# Patient Record
Sex: FEMALE | Race: WHITE | NOT HISPANIC OR LATINO
[De-identification: names, ages, dates, MRNs, and addresses within clinical notes are randomized per-mention and may not be internally consistent; named-entity substitution may affect disease eponyms.]

---

## 2022-01-03 ENCOUNTER — NON-APPOINTMENT (OUTPATIENT)
Age: 67
End: 2022-01-03

## 2022-01-25 ENCOUNTER — NON-APPOINTMENT (OUTPATIENT)
Age: 67
End: 2022-01-25

## 2022-01-25 ENCOUNTER — APPOINTMENT (OUTPATIENT)
Dept: FAMILY MEDICINE | Facility: CLINIC | Age: 67
End: 2022-01-25
Payer: COMMERCIAL

## 2022-01-25 ENCOUNTER — TRANSCRIPTION ENCOUNTER (OUTPATIENT)
Age: 67
End: 2022-01-25

## 2022-01-25 VITALS
SYSTOLIC BLOOD PRESSURE: 132 MMHG | OXYGEN SATURATION: 99 % | WEIGHT: 119 LBS | BODY MASS INDEX: 21.9 KG/M2 | DIASTOLIC BLOOD PRESSURE: 72 MMHG | RESPIRATION RATE: 16 BRPM | HEIGHT: 62 IN | HEART RATE: 69 BPM | TEMPERATURE: 97.5 F

## 2022-01-25 VITALS — DIASTOLIC BLOOD PRESSURE: 66 MMHG | SYSTOLIC BLOOD PRESSURE: 102 MMHG

## 2022-01-25 DIAGNOSIS — Z83.3 FAMILY HISTORY OF DIABETES MELLITUS: ICD-10-CM

## 2022-01-25 DIAGNOSIS — Z82.69 FAMILY HISTORY OF OTHER DISEASES OF THE MUSCULOSKELETAL SYSTEM AND CONNECTIVE TISSUE: ICD-10-CM

## 2022-01-25 DIAGNOSIS — Z82.0 FAMILY HISTORY OF EPILEPSY AND OTHER DISEASES OF THE NERVOUS SYSTEM: ICD-10-CM

## 2022-01-25 DIAGNOSIS — Z80.0 FAMILY HISTORY OF MALIGNANT NEOPLASM OF DIGESTIVE ORGANS: ICD-10-CM

## 2022-01-25 DIAGNOSIS — Z80.7 FAMILY HISTORY OF OTHER MALIGNANT NEOPLASMS OF LYMPHOID, HEMATOPOIETIC AND RELATED TISSUES: ICD-10-CM

## 2022-01-25 DIAGNOSIS — Z83.79 FAMILY HISTORY OF OTHER DISEASES OF THE DIGESTIVE SYSTEM: ICD-10-CM

## 2022-01-25 DIAGNOSIS — Z78.9 OTHER SPECIFIED HEALTH STATUS: ICD-10-CM

## 2022-01-25 PROCEDURE — 99387 INIT PM E/M NEW PAT 65+ YRS: CPT

## 2022-01-25 NOTE — HEALTH RISK ASSESSMENT
[HIV test declined] : HIV test declined [Hepatitis C test declined] : Hepatitis C test declined [Excellent] : ~his/her~  mood as  excellent [Never] : Never [No] : In the past 12 months have you used drugs other than those required for medical reasons? No [One fall no injury in past year] : Patient reported one fall in the past year without injury [0] : 2) Feeling down, depressed, or hopeless: Not at all (0) [PHQ-2 Negative - No further assessment needed] : PHQ-2 Negative - No further assessment needed [None] : None [With Family] : lives with family [Employed] : employed [] :  [Sexually Active] : sexually active [Feels Safe at Home] : Feels safe at home [Fully functional (bathing, dressing, toileting, transferring, walking, feeding)] : Fully functional (bathing, dressing, toileting, transferring, walking, feeding) [Fully functional (using the telephone, shopping, preparing meals, housekeeping, doing laundry, using] : Fully functional and needs no help or supervision to perform IADLs (using the telephone, shopping, preparing meals, housekeeping, doing laundry, using transportation, managing medications and managing finances) [1 or 2 (0 pts)] : 1 or 2 (0 points) [Never (0 pts)] : Never (0 points) [FreeTextEntry1] : none  [de-identified] : none  [Audit-CScore] : 0 [de-identified] : exercise bike 45 mins daily, walking  [de-identified] : balanced; no dairy; supplements- centrum mvn, citrical  [[id number]] : 0 [Change in mental status noted] : No change in mental status noted [Language] : denies difficulty with language [High Risk Behavior] : no high risk behavior [Reports changes in hearing] : Reports no changes in hearing [Reports changes in vision] : Reports no changes in vision [Reports changes in dental health] : Reports no changes in dental health [MammogramDate] : 03/2021 [MammogramComments] : will order [PapSmearDate] : 06/2021 [BoneDensityDate] : 06/2013 [BoneDensityComments] : took actonel and fosafax at the age of 41 for 10 years- had early menopause- had a broken rib at the time and broke a toe by bumping into a chair [ColonoscopyDate] : 08/2019 [ColonoscopyComments] : repeat in 5 years- son  of colon ca (30 yo)  [de-identified] : - moved from [place]  [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

## 2022-01-25 NOTE — HISTORY OF PRESENT ILLNESS
[FreeTextEntry1] : CPE [de-identified] : overall is feeling well\par denies any other complaints or concerns at this time

## 2022-01-25 NOTE — ASSESSMENT
[FreeTextEntry1] : Routine medical examination\par VSS- exam normal \par Advised healthy diet and exercise\par will follow up labs\par will follow up mammo and bone density  \par patient verbalizes understanding and patient is stable upon discharge\par

## 2022-02-03 ENCOUNTER — NON-APPOINTMENT (OUTPATIENT)
Age: 67
End: 2022-02-03

## 2022-02-15 LAB
25(OH)D3 SERPL-MCNC: 43.4 NG/ML
ALBUMIN SERPL ELPH-MCNC: 4.7 G/DL
ALP BLD-CCNC: 74 U/L
ALT SERPL-CCNC: 13 U/L
ANION GAP SERPL CALC-SCNC: 13 MMOL/L
AST SERPL-CCNC: 21 U/L
BASOPHILS # BLD AUTO: 0.01 K/UL
BASOPHILS NFR BLD AUTO: 0.2 %
BILIRUB DIRECT SERPL-MCNC: 0.1 MG/DL
BILIRUB INDIRECT SERPL-MCNC: 0.3 MG/DL
BILIRUB SERPL-MCNC: 0.4 MG/DL
BUN SERPL-MCNC: 12 MG/DL
CALCIUM SERPL-MCNC: 9.6 MG/DL
CHLORIDE SERPL-SCNC: 103 MMOL/L
CHOLEST SERPL-MCNC: 201 MG/DL
CO2 SERPL-SCNC: 23 MMOL/L
CREAT SERPL-MCNC: 0.6 MG/DL
EOSINOPHIL # BLD AUTO: 0.04 K/UL
EOSINOPHIL NFR BLD AUTO: 0.9 %
ESTIMATED AVERAGE GLUCOSE: 114 MG/DL
FOLATE SERPL-MCNC: >20 NG/ML
GLUCOSE SERPL-MCNC: 101 MG/DL
HBA1C MFR BLD HPLC: 5.6 %
HCT VFR BLD CALC: 40.8 %
HDLC SERPL-MCNC: 97 MG/DL
HGB BLD-MCNC: 12.5 G/DL
IMM GRANULOCYTES NFR BLD AUTO: 0.2 %
IRON SERPL-MCNC: 122 UG/DL
LDLC SERPL CALC-MCNC: 92 MG/DL
LYMPHOCYTES # BLD AUTO: 1.36 K/UL
LYMPHOCYTES NFR BLD AUTO: 30.6 %
MAN DIFF?: NORMAL
MCHC RBC-ENTMCNC: 29.1 PG
MCHC RBC-ENTMCNC: 30.6 GM/DL
MCV RBC AUTO: 95.1 FL
MONOCYTES # BLD AUTO: 0.32 K/UL
MONOCYTES NFR BLD AUTO: 7.2 %
NEUTROPHILS # BLD AUTO: 2.7 K/UL
NEUTROPHILS NFR BLD AUTO: 60.9 %
NONHDLC SERPL-MCNC: 104 MG/DL
PLATELET # BLD AUTO: 261 K/UL
POTASSIUM SERPL-SCNC: 4.1 MMOL/L
PROT SERPL-MCNC: 6.7 G/DL
RBC # BLD: 4.29 M/UL
RBC # FLD: 13.5 %
SODIUM SERPL-SCNC: 139 MMOL/L
T4 FREE SERPL-MCNC: 1.5 NG/DL
TRIGL SERPL-MCNC: 62 MG/DL
TSH SERPL-ACNC: 2.13 UIU/ML
VIT B12 SERPL-MCNC: 936 PG/ML
WBC # FLD AUTO: 4.44 K/UL

## 2022-04-13 ENCOUNTER — TRANSCRIPTION ENCOUNTER (OUTPATIENT)
Age: 67
End: 2022-04-13

## 2022-04-14 ENCOUNTER — TRANSCRIPTION ENCOUNTER (OUTPATIENT)
Age: 67
End: 2022-04-14

## 2022-04-26 DIAGNOSIS — M25.541 PAIN IN JOINTS OF RIGHT HAND: ICD-10-CM

## 2022-04-26 DIAGNOSIS — M25.542 PAIN IN JOINTS OF RIGHT HAND: ICD-10-CM

## 2022-05-23 ENCOUNTER — NON-APPOINTMENT (OUTPATIENT)
Age: 67
End: 2022-05-23

## 2022-05-24 ENCOUNTER — TRANSCRIPTION ENCOUNTER (OUTPATIENT)
Age: 67
End: 2022-05-24

## 2022-06-24 ENCOUNTER — APPOINTMENT (OUTPATIENT)
Dept: ORTHOPEDIC SURGERY | Facility: CLINIC | Age: 67
End: 2022-06-24

## 2022-06-24 DIAGNOSIS — M65.311 TRIGGER THUMB, RIGHT THUMB: ICD-10-CM

## 2022-06-24 DIAGNOSIS — M65.321 TRIGGER FINGER, RIGHT INDEX FINGER: ICD-10-CM

## 2022-06-24 DIAGNOSIS — M65.331 TRIGGER FINGER, RIGHT MIDDLE FINGER: ICD-10-CM

## 2022-06-24 PROCEDURE — 99204 OFFICE O/P NEW MOD 45 MIN: CPT | Mod: 25

## 2022-06-24 PROCEDURE — 20550 NJX 1 TENDON SHEATH/LIGAMENT: CPT | Mod: RT,F5

## 2022-06-24 RX ADMIN — LIDOCAINE HYDROCHLORIDE 0.5 %: 10 INJECTION, SOLUTION INFILTRATION; PERINEURAL at 00:00

## 2022-06-24 RX ADMIN — BETAMETHASONE SODIUM PHOSPHATE AND BETAMETHASONE ACETATE 1 MG/ML: 3; 3 INJECTION, SUSPENSION INTRA-ARTICULAR; INTRALESIONAL; INTRAMUSCULAR; SOFT TISSUE at 00:00

## 2022-06-24 NOTE — HISTORY OF PRESENT ILLNESS
[Right] : right hand dominant [FreeTextEntry1] : She comes in today for evaluation of right hand pain, localized to the thumb, index and middle fingers. She denies injury as well as numbness and tingling. She denies locking and triggering. She reports to have a history of arthritis in the hands. \par \par She is scheduled to begin Prolia in August.\par \par She has a history of bilateral ring trigger fingers. She was treated by another physician last June with a cortisone injection to each. \par \par Of note, she reports to type a great deal with regard to her job.

## 2022-06-24 NOTE — ADDENDUM
[FreeTextEntry1] : I, Adriana Ellsworth, acted solely as a scribe for Dr. Rasmussen on this date on 06/24/2022.

## 2022-06-24 NOTE — PHYSICAL EXAM
[de-identified] : - Constitutional: This is a female in no obvious distress.  \par - Psych: Patient is alert and oriented to person, place and time.  Patient has a normal mood and affect.\par - Cardiovascular: Normal pulses throughout the upper extremities.  No significant varicosities are noted in the upper extremities. \par - Neuro: Strength and sensation are intact throughout the upper extremities.  Patient has normal coordination.\par - Respiratory:  Patient exhibits no evidence of shortness of breath or difficulty breathing.\par - Skin: No rashes, lesions, or other abnormalities are noted in the upper extremities.\par \par --- \par \par Examination of her right thumb demonstrates mild swelling and tenderness along the A1 pulley.  There is no triggering.  She also has swelling and tenderness along the A1 pulley of the index finger and tender without swelling along the A1 pulley of the middle finger.  There is no triggering of the digits.  She has full flexion of the digits.  There is no tenderness along the PIP or DIP joints.  She is neurovascularly intact distally.

## 2022-06-24 NOTE — PROCEDURE
[FreeTextEntry1] : -  After a discussion of risks and benefits, the patient agreed to proceed with a cortisone injection.  \par -  Side: Right\par -  Finger 1: Trigger thumb\par -  Finger 2: Index finger trigger finger\par -  Finger 3: Middle finger trigger finger.\par -  Medications: 0.5 cc of 1% Lidocaine and 1 cc of betamethasone, using sterile technique.\par -  Patient tolerated the procedure well, without complications.\par -  Patient was told that the symptoms may worsen for a day or two, and should then begin to improve. \par -  Instructions: Patient was instructed on activity modification for the next several days.\par -  Follow-up: Within 4 weeks to assess response to the injection.

## 2022-06-24 NOTE — END OF VISIT
[FreeTextEntry3] : This note was written by Adriana Ellsworth on 06/24/2022 acting solely as a scribe for Dr. Mariano Rasmussen.\par  \par All medical record entries made by the Scribe were at my, Dr. Mariano Rasmussen, direction and personally dictated by me on 06/24/2022. I have personally reviewed the chart and agree that the record accurately reflects my personal performance of the history, physical exam, assessment and plan.

## 2022-06-24 NOTE — DISCUSSION/SUMMARY
[FreeTextEntry1] : She has findings consistent with right thumb, index and middle finger flexor tendinitis /trigger fingers. \par \par I had a discussion with the patient regarding today's visit, the prognosis of this diagnosis, and treatment recommendations and options. At this time, she agreed to proceed with cortisone injections to the flexor tendon sheath of the right thumb, index and middle fingers. \par \par She has agreed to the above plan of management and has expressed full understanding.  All questions were fully answered to their satisfaction. \par \par My cumulative time spent on this visit included: Preparation for the visit, review of the medical records, review of pertinent diagnostic studies, examination and counseling of the patient on the above diagnosis, treatment plan and prognosis, orders of diagnostic tests, medication and/or appropriate procedures and documentation in the medical records of today's visit.

## 2022-06-25 RX ORDER — LIDOCAINE HYDROCHLORIDE 10 MG/ML
1 INJECTION, SOLUTION INFILTRATION; PERINEURAL
Refills: 0 | Status: COMPLETED | OUTPATIENT
Start: 2022-06-24

## 2022-06-25 RX ORDER — BETAMETHA AC,SOD PHOS/WATER/PF 6 MG/ML
6 (3-3) VIAL (ML) INJECTION
Qty: 1 | Refills: 0 | Status: COMPLETED | OUTPATIENT
Start: 2022-06-24

## 2022-08-04 ENCOUNTER — APPOINTMENT (OUTPATIENT)
Dept: OBGYN | Facility: CLINIC | Age: 67
End: 2022-08-04

## 2022-08-04 VITALS
BODY MASS INDEX: 22.63 KG/M2 | DIASTOLIC BLOOD PRESSURE: 73 MMHG | SYSTOLIC BLOOD PRESSURE: 150 MMHG | HEIGHT: 62 IN | HEART RATE: 72 BPM | WEIGHT: 123 LBS

## 2022-08-04 DIAGNOSIS — Z63.4 DISAPPEARANCE AND DEATH OF FAMILY MEMBER: ICD-10-CM

## 2022-08-04 PROCEDURE — 99387 INIT PM E/M NEW PAT 65+ YRS: CPT

## 2022-08-04 RX ORDER — DENOSUMAB 60 MG/ML
60 INJECTION SUBCUTANEOUS
Qty: 1 | Refills: 0 | Status: ACTIVE | COMMUNITY
Start: 2022-06-13

## 2022-08-04 RX ORDER — FEXOFENADINE HCL 60 MG
TABLET ORAL
Refills: 0 | Status: ACTIVE | COMMUNITY

## 2022-08-04 RX ORDER — MULTIVITAMIN
CAPSULE ORAL
Refills: 0 | Status: ACTIVE | COMMUNITY

## 2022-08-04 SDOH — SOCIAL STABILITY - SOCIAL INSECURITY: DISSAPEARANCE AND DEATH OF FAMILY MEMBER: Z63.4

## 2022-08-04 NOTE — HISTORY OF PRESENT ILLNESS
[Mammogramdate] : EMR [BreastSonogramDate] : EMR [PapSmeardate] : EMR [BoneDensityDate] : 2022 [ColonoscopyDate] : 2019

## 2022-08-05 LAB — HPV HIGH+LOW RISK DNA PNL CVX: NOT DETECTED

## 2022-08-09 LAB — CYTOLOGY CVX/VAG DOC THIN PREP: ABNORMAL

## 2023-01-09 ENCOUNTER — NON-APPOINTMENT (OUTPATIENT)
Age: 68
End: 2023-01-09

## 2023-01-31 ENCOUNTER — APPOINTMENT (OUTPATIENT)
Dept: FAMILY MEDICINE | Facility: CLINIC | Age: 68
End: 2023-01-31
Payer: COMMERCIAL

## 2023-01-31 VITALS
HEIGHT: 62 IN | BODY MASS INDEX: 23 KG/M2 | RESPIRATION RATE: 16 BRPM | SYSTOLIC BLOOD PRESSURE: 140 MMHG | OXYGEN SATURATION: 98 % | DIASTOLIC BLOOD PRESSURE: 70 MMHG | HEART RATE: 67 BPM | WEIGHT: 125 LBS | TEMPERATURE: 97.7 F

## 2023-01-31 DIAGNOSIS — Z13.820 ENCOUNTER FOR SCREENING FOR OSTEOPOROSIS: ICD-10-CM

## 2023-01-31 DIAGNOSIS — Z12.39 ENCOUNTER FOR OTHER SCREENING FOR MALIGNANT NEOPLASM OF BREAST: ICD-10-CM

## 2023-01-31 DIAGNOSIS — Z82.49 FAMILY HISTORY OF ISCHEMIC HEART DISEASE AND OTHER DISEASES OF THE CIRCULATORY SYSTEM: ICD-10-CM

## 2023-01-31 DIAGNOSIS — Z12.83 ENCOUNTER FOR SCREENING FOR MALIGNANT NEOPLASM OF SKIN: ICD-10-CM

## 2023-01-31 DIAGNOSIS — Z87.39 PERSONAL HISTORY OF OTHER DISEASES OF THE MUSCULOSKELETAL SYSTEM AND CONNECTIVE TISSUE: ICD-10-CM

## 2023-01-31 PROCEDURE — 93000 ELECTROCARDIOGRAM COMPLETE: CPT

## 2023-01-31 PROCEDURE — 99397 PER PM REEVAL EST PAT 65+ YR: CPT | Mod: 25

## 2023-02-06 NOTE — HISTORY OF PRESENT ILLNESS
[FreeTextEntry1] : CPE [de-identified] : overall is feeling well\par denies any other complaints or concerns at this time

## 2023-02-06 NOTE — HEALTH RISK ASSESSMENT
[Very Good] : ~his/her~  mood as very good [Never] : Never [No] : In the past 12 months have you used drugs other than those required for medical reasons? No [1 or 2 (0 pts)] : 1 or 2 (0 points) [Never (0 pts)] : Never (0 points) [0] : 2) Feeling down, depressed, or hopeless: Not at all (0) [PHQ-2 Negative - No further assessment needed] : PHQ-2 Negative - No further assessment needed [Patient reported mammogram was normal] : Patient reported mammogram was normal [Patient reported PAP Smear was normal] : Patient reported PAP Smear was normal [Patient reported bone density results were abnormal] : Patient reported bone density results were abnormal [Patient reported colonoscopy was normal] : Patient reported colonoscopy was normal [HIV test declined] : HIV test declined [Hepatitis C test declined] : Hepatitis C test declined [None] : None [With Family] : lives with family [] :  [Feels Safe at Home] : Feels safe at home [Fully functional (bathing, dressing, toileting, transferring, walking, feeding)] : Fully functional (bathing, dressing, toileting, transferring, walking, feeding) [Fully functional (using the telephone, shopping, preparing meals, housekeeping, doing laundry, using] : Fully functional and needs no help or supervision to perform IADLs (using the telephone, shopping, preparing meals, housekeeping, doing laundry, using transportation, managing medications and managing finances) [FreeTextEntry1] : none [de-identified] : none [de-identified] : endo, gyn, ortho  [Audit-CScore] : 0 [de-identified] : walking  [de-identified] : no dairy or red meat; supplements- calcium, mvn, claritin, probiotic  [[id number]] : 0 [Change in mental status noted] : No change in mental status noted [Language] : denies difficulty with language [Reports changes in hearing] : Reports no changes in hearing [Reports changes in vision] : Reports no changes in vision [Reports changes in dental health] : Reports no changes in dental health [MammogramDate] : 04/2022  [MammogramComments] : will reorder  [PapSmearDate] : 08/2022  [BoneDensityDate] : 04/2022 [ColonoscopyDate] : 08/2019  [de-identified] :   [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

## 2023-08-14 ENCOUNTER — NON-APPOINTMENT (OUTPATIENT)
Age: 68
End: 2023-08-14

## 2023-08-15 ENCOUNTER — APPOINTMENT (OUTPATIENT)
Dept: OBGYN | Facility: CLINIC | Age: 68
End: 2023-08-15
Payer: COMMERCIAL

## 2023-08-15 VITALS
SYSTOLIC BLOOD PRESSURE: 153 MMHG | DIASTOLIC BLOOD PRESSURE: 80 MMHG | WEIGHT: 126.2 LBS | HEIGHT: 62 IN | HEART RATE: 67 BPM | BODY MASS INDEX: 23.22 KG/M2

## 2023-08-15 DIAGNOSIS — Z01.419 ENCOUNTER FOR GYNECOLOGICAL EXAMINATION (GENERAL) (ROUTINE) W/OUT ABNORMAL FINDINGS: ICD-10-CM

## 2023-08-15 PROCEDURE — 99397 PER PM REEVAL EST PAT 65+ YR: CPT

## 2023-08-15 NOTE — HISTORY OF PRESENT ILLNESS
[BreastSonogramDate] : EMR [Mammogramdate] : EMR [PapSmeardate] : EMR [BoneDensityDate] : 2022 [ColonoscopyDate] : 2019

## 2023-09-05 ENCOUNTER — APPOINTMENT (OUTPATIENT)
Dept: ORTHOPEDIC SURGERY | Facility: CLINIC | Age: 68
End: 2023-09-05

## 2023-11-16 ENCOUNTER — TRANSCRIPTION ENCOUNTER (OUTPATIENT)
Age: 68
End: 2023-11-16

## 2024-02-05 ENCOUNTER — APPOINTMENT (OUTPATIENT)
Dept: FAMILY MEDICINE | Facility: CLINIC | Age: 69
End: 2024-02-05
Payer: COMMERCIAL

## 2024-02-05 VITALS
HEIGHT: 61.5 IN | RESPIRATION RATE: 16 BRPM | HEART RATE: 64 BPM | SYSTOLIC BLOOD PRESSURE: 112 MMHG | DIASTOLIC BLOOD PRESSURE: 70 MMHG | BODY MASS INDEX: 23.86 KG/M2 | TEMPERATURE: 97.3 F | OXYGEN SATURATION: 99 % | WEIGHT: 128 LBS

## 2024-02-05 DIAGNOSIS — M85.9 DISORDER OF BONE DENSITY AND STRUCTURE, UNSPECIFIED: ICD-10-CM

## 2024-02-05 DIAGNOSIS — Z00.00 ENCOUNTER FOR GENERAL ADULT MEDICAL EXAMINATION W/OUT ABNORMAL FINDINGS: ICD-10-CM

## 2024-02-05 LAB
25(OH)D3 SERPL-MCNC: 42.1 NG/ML
25(OH)D3 SERPL-MCNC: 47.1 NG/ML
ALBUMIN SERPL ELPH-MCNC: 4.4 G/DL
ALBUMIN SERPL ELPH-MCNC: 4.4 G/DL
ALP BLD-CCNC: 45 U/L
ALP BLD-CCNC: 46 U/L
ALT SERPL-CCNC: 13 U/L
ALT SERPL-CCNC: 14 U/L
ANION GAP SERPL CALC-SCNC: 14 MMOL/L
ANION GAP SERPL CALC-SCNC: 9 MMOL/L
AST SERPL-CCNC: 22 U/L
AST SERPL-CCNC: 23 U/L
BASOPHILS # BLD AUTO: 0.01 K/UL
BASOPHILS NFR BLD AUTO: 0.2 %
BILIRUB DIRECT SERPL-MCNC: 0.1 MG/DL
BILIRUB INDIRECT SERPL-MCNC: 0.2 MG/DL
BILIRUB SERPL-MCNC: 0.2 MG/DL
BILIRUB SERPL-MCNC: 0.3 MG/DL
BUN SERPL-MCNC: 13 MG/DL
BUN SERPL-MCNC: 14 MG/DL
CALCIUM SERPL-MCNC: 9 MG/DL
CALCIUM SERPL-MCNC: 9.9 MG/DL
CHLORIDE SERPL-SCNC: 106 MMOL/L
CHLORIDE SERPL-SCNC: 109 MMOL/L
CHOLEST SERPL-MCNC: 179 MG/DL
CO2 SERPL-SCNC: 21 MMOL/L
CO2 SERPL-SCNC: 24 MMOL/L
CREAT SERPL-MCNC: 0.6 MG/DL
CREAT SERPL-MCNC: 0.67 MG/DL
EGFR: 96 ML/MIN/1.73M2
EGFR: 98 ML/MIN/1.73M2
EOSINOPHIL # BLD AUTO: 0.05 K/UL
EOSINOPHIL NFR BLD AUTO: 1.1 %
ESTIMATED AVERAGE GLUCOSE: 114 MG/DL
FOLATE SERPL-MCNC: >20 NG/ML
GLUCOSE SERPL-MCNC: 95 MG/DL
GLUCOSE SERPL-MCNC: 96 MG/DL
HBA1C MFR BLD HPLC: 5.6 %
HCT VFR BLD CALC: 36.9 %
HDLC SERPL-MCNC: 82 MG/DL
HGB BLD-MCNC: 12.1 G/DL
IMM GRANULOCYTES NFR BLD AUTO: 0.5 %
IRON SERPL-MCNC: 83 UG/DL
LDLC SERPL CALC-MCNC: 87 MG/DL
LYMPHOCYTES # BLD AUTO: 1.48 K/UL
LYMPHOCYTES NFR BLD AUTO: 34 %
MAN DIFF?: NORMAL
MCHC RBC-ENTMCNC: 30.1 PG
MCHC RBC-ENTMCNC: 32.8 GM/DL
MCV RBC AUTO: 91.8 FL
MONOCYTES # BLD AUTO: 0.34 K/UL
MONOCYTES NFR BLD AUTO: 7.8 %
NEUTROPHILS # BLD AUTO: 2.45 K/UL
NEUTROPHILS NFR BLD AUTO: 56.4 %
NONHDLC SERPL-MCNC: 97 MG/DL
PLATELET # BLD AUTO: 240 K/UL
POTASSIUM SERPL-SCNC: 4.2 MMOL/L
POTASSIUM SERPL-SCNC: 4.4 MMOL/L
PROT SERPL-MCNC: 6.2 G/DL
PROT SERPL-MCNC: 6.4 G/DL
RBC # BLD: 4.02 M/UL
RBC # FLD: 12.7 %
SODIUM SERPL-SCNC: 139 MMOL/L
SODIUM SERPL-SCNC: 144 MMOL/L
T4 FREE SERPL-MCNC: 1.2 NG/DL
TRIGL SERPL-MCNC: 52 MG/DL
TSH SERPL-ACNC: 2.02 UIU/ML
TSH SERPL-ACNC: 2.09 UIU/ML
VIT B12 SERPL-MCNC: 834 PG/ML
WBC # FLD AUTO: 4.35 K/UL

## 2024-02-05 PROCEDURE — 99397 PER PM REEVAL EST PAT 65+ YR: CPT

## 2024-02-05 RX ORDER — KETOCONAZOLE 20 MG/G
2 CREAM TOPICAL
Qty: 60 | Refills: 0 | Status: COMPLETED | COMMUNITY
Start: 2022-08-02 | End: 2024-02-05

## 2024-02-05 RX ORDER — CICLOPIROX OLAMINE 7.7 MG/G
0.77 CREAM TOPICAL
Qty: 90 | Refills: 0 | Status: ACTIVE | COMMUNITY
Start: 2024-01-02

## 2024-02-05 NOTE — HISTORY OF PRESENT ILLNESS
[FreeTextEntry1] : CPE [de-identified] : 67 yo f, pmhx of osteoporosis, here for CPE overall is feeling well denies any other associated symptoms denies any other complaints or concerns at this time

## 2024-02-05 NOTE — HEALTH RISK ASSESSMENT
[Very Good] : ~his/her~  mood as very good [1 or 2 (0 pts)] : 1 or 2 (0 points) [Never (0 pts)] : Never (0 points) [No] : In the past 12 months have you used drugs other than those required for medical reasons? No [0] : 2) Feeling down, depressed, or hopeless: Not at all (0) [PHQ-2 Negative - No further assessment needed] : PHQ-2 Negative - No further assessment needed [Patient reported mammogram was normal] : Patient reported mammogram was normal [Patient reported PAP Smear was normal] : Patient reported PAP Smear was normal [Patient reported bone density results were abnormal] : Patient reported bone density results were abnormal [Patient reported colonoscopy was normal] : Patient reported colonoscopy was normal [HIV test declined] : HIV test declined [Hepatitis C test declined] : Hepatitis C test declined [None] : None [With Family] : lives with family [] :  [Feels Safe at Home] : Feels safe at home [Fully functional (bathing, dressing, toileting, transferring, walking, feeding)] : Fully functional (bathing, dressing, toileting, transferring, walking, feeding) [Fully functional (using the telephone, shopping, preparing meals, housekeeping, doing laundry, using] : Fully functional and needs no help or supervision to perform IADLs (using the telephone, shopping, preparing meals, housekeeping, doing laundry, using transportation, managing medications and managing finances) [No falls in past year] : Patient reported no falls in the past year [Never] : Never [FreeTextEntry1] : none [de-identified] : none [de-identified] : endo, gyn, cardio, derm [Audit-CScore] : 0 [de-identified] : walking- 45 minutes, exercise bike [de-identified] : no dairy or red meat; supplements- calcium, mvn, claritin, probiotic, occ tumeric  [[id number]] : 0 [Change in mental status noted] : No change in mental status noted [Language] : denies difficulty with language [Reports changes in hearing] : Reports no changes in hearing [Reports changes in vision] : Reports no changes in vision [Reports changes in dental health] : Reports no changes in dental health [MammogramDate] : 04/2023 [MammogramComments] : will reorder  [PapSmearDate] : 08/2022  [BoneDensityDate] : 04/2022 [BoneDensityComments] : on prolia, seeing endo  [ColonoscopyDate] : 08/2019  [ColonoscopyComments] : will refer- last 5 years ago  [de-identified] :   [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 Davis, for ear wax  [de-identified] : wears glasses

## 2024-02-05 NOTE — ASSESSMENT
[FreeTextEntry1] : Routine medical examination VSS- exam normal  c/w current medications Advised healthy diet and exercise reviewed labs with patient utd on flu/covid/rsv vaccines  patient verbalizes understanding and patient is stable upon discharge

## 2024-04-24 ENCOUNTER — NON-APPOINTMENT (OUTPATIENT)
Age: 69
End: 2024-04-24

## 2024-04-25 ENCOUNTER — NON-APPOINTMENT (OUTPATIENT)
Age: 69
End: 2024-04-25

## 2024-04-25 DIAGNOSIS — R92.8 OTHER ABNORMAL AND INCONCLUSIVE FINDINGS ON DIAGNOSTIC IMAGING OF BREAST: ICD-10-CM

## 2024-05-02 ENCOUNTER — NON-APPOINTMENT (OUTPATIENT)
Age: 69
End: 2024-05-02

## 2024-05-13 ENCOUNTER — TRANSCRIPTION ENCOUNTER (OUTPATIENT)
Age: 69
End: 2024-05-13

## 2024-06-11 ENCOUNTER — TRANSCRIPTION ENCOUNTER (OUTPATIENT)
Age: 69
End: 2024-06-11

## 2024-06-11 ENCOUNTER — NON-APPOINTMENT (OUTPATIENT)
Age: 69
End: 2024-06-11

## 2024-06-11 DIAGNOSIS — G89.29 LOW BACK PAIN, UNSPECIFIED: ICD-10-CM

## 2024-06-11 DIAGNOSIS — M54.50 LOW BACK PAIN, UNSPECIFIED: ICD-10-CM

## 2024-06-25 ENCOUNTER — NON-APPOINTMENT (OUTPATIENT)
Age: 69
End: 2024-06-25

## 2024-06-25 ENCOUNTER — APPOINTMENT (OUTPATIENT)
Dept: PHYSICAL MEDICINE AND REHAB | Facility: CLINIC | Age: 69
End: 2024-06-25
Payer: COMMERCIAL

## 2024-06-25 DIAGNOSIS — M54.16 RADICULOPATHY, LUMBAR REGION: ICD-10-CM

## 2024-06-25 DIAGNOSIS — M25.551 PAIN IN RIGHT HIP: ICD-10-CM

## 2024-06-25 DIAGNOSIS — G89.29 PAIN IN RIGHT HIP: ICD-10-CM

## 2024-06-25 PROCEDURE — 99204 OFFICE O/P NEW MOD 45 MIN: CPT

## 2024-06-25 RX ORDER — MELOXICAM 15 MG/1
15 TABLET ORAL
Qty: 30 | Refills: 1 | Status: ACTIVE | COMMUNITY
Start: 2024-06-25 | End: 1900-01-01

## 2024-07-02 ENCOUNTER — APPOINTMENT (OUTPATIENT)
Dept: RADIOLOGY | Facility: CLINIC | Age: 69
End: 2024-07-02
Payer: COMMERCIAL

## 2024-07-02 ENCOUNTER — RESULT REVIEW (OUTPATIENT)
Age: 69
End: 2024-07-02

## 2024-07-02 PROCEDURE — 73521 X-RAY EXAM HIPS BI 2 VIEWS: CPT

## 2024-07-02 PROCEDURE — 72100 X-RAY EXAM L-S SPINE 2/3 VWS: CPT

## 2024-07-15 ENCOUNTER — APPOINTMENT (OUTPATIENT)
Dept: PHYSICAL MEDICINE AND REHAB | Facility: CLINIC | Age: 69
End: 2024-07-15
Payer: COMMERCIAL

## 2024-07-17 ENCOUNTER — APPOINTMENT (OUTPATIENT)
Dept: PHYSICAL MEDICINE AND REHAB | Facility: CLINIC | Age: 69
End: 2024-07-17
Payer: COMMERCIAL

## 2024-07-17 DIAGNOSIS — M85.9 DISORDER OF BONE DENSITY AND STRUCTURE, UNSPECIFIED: ICD-10-CM

## 2024-07-17 DIAGNOSIS — Z87.39 PERSONAL HISTORY OF OTHER DISEASES OF THE MUSCULOSKELETAL SYSTEM AND CONNECTIVE TISSUE: ICD-10-CM

## 2024-07-17 DIAGNOSIS — M54.16 RADICULOPATHY, LUMBAR REGION: ICD-10-CM

## 2024-07-17 PROCEDURE — 99213 OFFICE O/P EST LOW 20 MIN: CPT

## 2024-08-20 ENCOUNTER — APPOINTMENT (OUTPATIENT)
Dept: OBGYN | Facility: CLINIC | Age: 69
End: 2024-08-20
Payer: COMMERCIAL

## 2024-08-20 VITALS
DIASTOLIC BLOOD PRESSURE: 83 MMHG | WEIGHT: 125 LBS | HEART RATE: 64 BPM | BODY MASS INDEX: 23.3 KG/M2 | SYSTOLIC BLOOD PRESSURE: 148 MMHG | HEIGHT: 61.5 IN

## 2024-08-20 DIAGNOSIS — Z01.419 ENCOUNTER FOR GYNECOLOGICAL EXAMINATION (GENERAL) (ROUTINE) W/OUT ABNORMAL FINDINGS: ICD-10-CM

## 2024-08-20 PROCEDURE — 99397 PER PM REEVAL EST PAT 65+ YR: CPT

## 2024-08-20 PROCEDURE — 99459 PELVIC EXAMINATION: CPT

## 2024-08-20 NOTE — HISTORY OF PRESENT ILLNESS
[FreeTextEntry1] : 67 yo female ROBERTO CARLOS [Mammogramdate] : 2024 [BreastSonogramDate] : 2024 [PapSmeardate] : 2022 [BoneDensityDate] : 2024 [ColonoscopyDate] : 2024

## 2024-08-20 NOTE — PHYSICAL EXAM
[Chaperone Present] : A chaperone was present in the examining room during all aspects of the physical examination [FreeTextEntry2] : christiane casiano [Appropriately responsive] : appropriately responsive [Alert] : alert [No Acute Distress] : no acute distress [No Lymphadenopathy] : no lymphadenopathy [Soft] : soft [Non-tender] : non-tender [Non-distended] : non-distended [No HSM] : No HSM [No Lesions] : no lesions [No Mass] : no mass [Oriented x3] : oriented x3 [Examination Of The Breasts] : a normal appearance [No Masses] : no breast masses were palpable [Labia Majora] : normal [Labia Minora] : normal [Normal] : normal [Uterine Adnexae] : normal

## 2024-08-20 NOTE — HISTORY OF PRESENT ILLNESS
[FreeTextEntry1] : 69 yo female ROBERTO CARLOS [Mammogramdate] : 2024 [BreastSonogramDate] : 2024 [PapSmeardate] : 2022 [BoneDensityDate] : 2024 [ColonoscopyDate] : 2024

## 2024-12-05 ENCOUNTER — NON-APPOINTMENT (OUTPATIENT)
Age: 69
End: 2024-12-05

## 2024-12-09 ENCOUNTER — TRANSCRIPTION ENCOUNTER (OUTPATIENT)
Age: 69
End: 2024-12-09

## 2024-12-10 ENCOUNTER — TRANSCRIPTION ENCOUNTER (OUTPATIENT)
Age: 69
End: 2024-12-10

## 2024-12-17 DIAGNOSIS — Z00.00 ENCOUNTER FOR GENERAL ADULT MEDICAL EXAMINATION W/OUT ABNORMAL FINDINGS: ICD-10-CM

## 2025-03-07 DIAGNOSIS — Z00.00 ENCOUNTER FOR GENERAL ADULT MEDICAL EXAMINATION W/OUT ABNORMAL FINDINGS: ICD-10-CM

## 2025-03-10 ENCOUNTER — TRANSCRIPTION ENCOUNTER (OUTPATIENT)
Age: 70
End: 2025-03-10

## 2025-03-13 DIAGNOSIS — Z28.39 OTHER UNDERIMMUNIZATION STATUS: ICD-10-CM

## 2025-03-18 ENCOUNTER — TRANSCRIPTION ENCOUNTER (OUTPATIENT)
Age: 70
End: 2025-03-18

## 2025-03-18 LAB
MEV IGG FLD QL IA: 11.4 AU/ML
MEV IGG+IGM SER-IMP: NEGATIVE
MUV AB SER-ACNC: POSITIVE
MUV IGG SER QL IA: 137 AU/ML
RUBV IGG FLD-ACNC: 6.08 INDEX
RUBV IGG SER-IMP: POSITIVE

## 2025-03-24 ENCOUNTER — APPOINTMENT (OUTPATIENT)
Dept: FAMILY MEDICINE | Facility: CLINIC | Age: 70
End: 2025-03-24
Payer: MEDICARE

## 2025-03-24 ENCOUNTER — NON-APPOINTMENT (OUTPATIENT)
Age: 70
End: 2025-03-24

## 2025-03-24 VITALS
BODY MASS INDEX: 21.16 KG/M2 | OXYGEN SATURATION: 98 % | WEIGHT: 115 LBS | SYSTOLIC BLOOD PRESSURE: 136 MMHG | HEART RATE: 57 BPM | HEIGHT: 62 IN | TEMPERATURE: 95.5 F | RESPIRATION RATE: 16 BRPM | DIASTOLIC BLOOD PRESSURE: 80 MMHG

## 2025-03-24 DIAGNOSIS — Z87.39 PERSONAL HISTORY OF OTHER DISEASES OF THE MUSCULOSKELETAL SYSTEM AND CONNECTIVE TISSUE: ICD-10-CM

## 2025-03-24 DIAGNOSIS — M25.541 PAIN IN JOINTS OF RIGHT HAND: ICD-10-CM

## 2025-03-24 DIAGNOSIS — R00.1 BRADYCARDIA, UNSPECIFIED: ICD-10-CM

## 2025-03-24 DIAGNOSIS — M25.542 PAIN IN JOINTS OF RIGHT HAND: ICD-10-CM

## 2025-03-24 DIAGNOSIS — Z13.6 ENCOUNTER FOR SCREENING FOR CARDIOVASCULAR DISORDERS: ICD-10-CM

## 2025-03-24 DIAGNOSIS — M65.321 TRIGGER FINGER, RIGHT INDEX FINGER: ICD-10-CM

## 2025-03-24 DIAGNOSIS — Z00.00 ENCOUNTER FOR GENERAL ADULT MEDICAL EXAMINATION W/OUT ABNORMAL FINDINGS: ICD-10-CM

## 2025-03-24 DIAGNOSIS — M65.311 TRIGGER THUMB, RIGHT THUMB: ICD-10-CM

## 2025-03-24 DIAGNOSIS — M65.331 TRIGGER FINGER, RIGHT MIDDLE FINGER: ICD-10-CM

## 2025-03-24 PROBLEM — Z23 NEED FOR VACCINATION FOR MEASLES: Status: ACTIVE | Noted: 2025-03-24

## 2025-03-24 PROCEDURE — G0403: CPT

## 2025-03-24 PROCEDURE — G0402 INITIAL PREVENTIVE EXAM: CPT

## 2025-03-24 RX ORDER — FLUTICASONE PROPIONATE 50 MCG
50 SPRAY, SUSPENSION NASAL
Refills: 0 | Status: ACTIVE | COMMUNITY

## 2025-04-02 ENCOUNTER — APPOINTMENT (OUTPATIENT)
Dept: FAMILY MEDICINE | Facility: CLINIC | Age: 70
End: 2025-04-02
Payer: MEDICARE

## 2025-04-02 DIAGNOSIS — Z23 ENCOUNTER FOR IMMUNIZATION: ICD-10-CM

## 2025-04-02 PROCEDURE — 90707 MMR VACCINE SC: CPT | Mod: GY

## 2025-04-02 PROCEDURE — 90471 IMMUNIZATION ADMIN: CPT

## 2025-05-06 ENCOUNTER — APPOINTMENT (OUTPATIENT)
Dept: FAMILY MEDICINE | Facility: CLINIC | Age: 70
End: 2025-05-06
Payer: MEDICARE

## 2025-05-06 DIAGNOSIS — Z23 ENCOUNTER FOR IMMUNIZATION: ICD-10-CM

## 2025-05-06 PROCEDURE — 90707 MMR VACCINE SC: CPT | Mod: GY

## 2025-05-06 PROCEDURE — 90471 IMMUNIZATION ADMIN: CPT

## 2025-08-22 ENCOUNTER — TRANSCRIPTION ENCOUNTER (OUTPATIENT)
Age: 70
End: 2025-08-22

## 2025-08-22 ENCOUNTER — APPOINTMENT (OUTPATIENT)
Dept: OBGYN | Facility: CLINIC | Age: 70
End: 2025-08-22
Payer: MEDICARE

## 2025-08-22 VITALS — DIASTOLIC BLOOD PRESSURE: 81 MMHG | SYSTOLIC BLOOD PRESSURE: 147 MMHG

## 2025-08-22 VITALS
WEIGHT: 113 LBS | BODY MASS INDEX: 20.8 KG/M2 | HEART RATE: 51 BPM | HEIGHT: 62 IN | DIASTOLIC BLOOD PRESSURE: 75 MMHG | SYSTOLIC BLOOD PRESSURE: 152 MMHG

## 2025-08-22 DIAGNOSIS — N60.19 DIFFUSE CYSTIC MASTOPATHY OF UNSPECIFIED BREAST: ICD-10-CM

## 2025-08-22 DIAGNOSIS — Z63.4 DISAPPEARANCE AND DEATH OF FAMILY MEMBER: ICD-10-CM

## 2025-08-22 DIAGNOSIS — Z01.419 ENCOUNTER FOR GYNECOLOGICAL EXAMINATION (GENERAL) (ROUTINE) W/OUT ABNORMAL FINDINGS: ICD-10-CM

## 2025-08-22 PROCEDURE — G0101: CPT

## 2025-08-22 SDOH — SOCIAL STABILITY - SOCIAL INSECURITY: DISSAPEARANCE AND DEATH OF FAMILY MEMBER: Z63.4

## 2025-08-24 LAB — HPV HIGH+LOW RISK DNA PNL CVX: NOT DETECTED

## 2025-08-26 ENCOUNTER — TRANSCRIPTION ENCOUNTER (OUTPATIENT)
Age: 70
End: 2025-08-26

## 2025-08-30 LAB — CYTOLOGY CVX/VAG DOC THIN PREP: ABNORMAL
